# Patient Record
Sex: FEMALE | Race: WHITE | NOT HISPANIC OR LATINO | Employment: OTHER | URBAN - METROPOLITAN AREA
[De-identification: names, ages, dates, MRNs, and addresses within clinical notes are randomized per-mention and may not be internally consistent; named-entity substitution may affect disease eponyms.]

---

## 2017-02-22 ENCOUNTER — CONVERSION ENCOUNTER (OUTPATIENT)
Dept: SURGERY | Facility: CLINIC | Age: 67
End: 2017-02-22

## 2017-03-08 ENCOUNTER — CONVERSION ENCOUNTER (OUTPATIENT)
Dept: SURGERY | Facility: CLINIC | Age: 67
End: 2017-03-08

## 2017-03-29 ENCOUNTER — CONVERSION ENCOUNTER (OUTPATIENT)
Dept: SURGERY | Facility: CLINIC | Age: 67
End: 2017-03-29

## 2017-04-12 ENCOUNTER — CONVERSION ENCOUNTER (OUTPATIENT)
Dept: SURGERY | Facility: CLINIC | Age: 67
End: 2017-04-12

## 2017-04-14 ENCOUNTER — CONVERSION ENCOUNTER (OUTPATIENT)
Dept: SURGERY | Facility: CLINIC | Age: 67
End: 2017-04-14

## 2017-05-17 ENCOUNTER — CONVERSION ENCOUNTER (OUTPATIENT)
Dept: SURGERY | Facility: CLINIC | Age: 67
End: 2017-05-17

## 2018-01-04 ENCOUNTER — HOSPITAL ENCOUNTER (OUTPATIENT)
Dept: CARDIOLOGY | Facility: HOSPITAL | Age: 68
Discharge: HOME | End: 2018-01-04
Attending: INTERNAL MEDICINE | Admitting: INTERNAL MEDICINE

## 2019-06-03 VITALS
DIASTOLIC BLOOD PRESSURE: 87 MMHG | WEIGHT: 146 LBS | OXYGEN SATURATION: 100 % | WEIGHT: 146 LBS | HEART RATE: 97 BPM | WEIGHT: 146 LBS | WEIGHT: 146 LBS | DIASTOLIC BLOOD PRESSURE: 87 MMHG | SYSTOLIC BLOOD PRESSURE: 158 MMHG | OXYGEN SATURATION: 100 % | WEIGHT: 146 LBS | HEART RATE: 97 BPM | WEIGHT: 146 LBS | SYSTOLIC BLOOD PRESSURE: 158 MMHG

## 2020-12-16 ENCOUNTER — TRANSCRIBE ORDERS (OUTPATIENT)
Dept: ADMINISTRATIVE | Facility: HOSPITAL | Age: 70
End: 2020-12-16

## 2020-12-16 DIAGNOSIS — I42.9 CARDIOMYOPATHY, UNSPECIFIED TYPE (HCC): Primary | ICD-10-CM

## 2020-12-30 ENCOUNTER — APPOINTMENT (OUTPATIENT)
Dept: NUCLEAR MEDICINE | Facility: HOSPITAL | Age: 70
End: 2020-12-30

## 2021-01-07 ENCOUNTER — HOSPITAL ENCOUNTER (OUTPATIENT)
Dept: NUCLEAR MEDICINE | Facility: HOSPITAL | Age: 71
Discharge: HOME | End: 2021-01-07

## 2021-01-07 DIAGNOSIS — I42.9 CARDIOMYOPATHY, UNSPECIFIED TYPE (HCC): ICD-10-CM

## 2021-01-07 LAB
BH CV STRESS BP STAGE 1: NORMAL
BH CV STRESS BP STAGE 2: NORMAL
BH CV STRESS BP STAGE 3: NORMAL
BH CV STRESS DURATION MIN STAGE 1: 3
BH CV STRESS DURATION MIN STAGE 2: 3
BH CV STRESS DURATION MIN STAGE 3: 0
BH CV STRESS DURATION SEC STAGE 1: 0
BH CV STRESS DURATION SEC STAGE 2: 0
BH CV STRESS DURATION SEC STAGE 3: 9
BH CV STRESS GRADE STAGE 1: 10
BH CV STRESS GRADE STAGE 2: 12
BH CV STRESS GRADE STAGE 3: 14
BH CV STRESS HR STAGE 1: 115
BH CV STRESS HR STAGE 2: 149
BH CV STRESS HR STAGE 3: 160
BH CV STRESS METS STAGE 1: 5
BH CV STRESS METS STAGE 2: 7.5
BH CV STRESS METS STAGE 3: 10
BH CV STRESS PROTOCOL 1: NORMAL
BH CV STRESS RECOVERY BP: NORMAL MMHG
BH CV STRESS RECOVERY HR: 87 BPM
BH CV STRESS SPEED STAGE 1: 1.7
BH CV STRESS SPEED STAGE 2: 2.5
BH CV STRESS SPEED STAGE 3: 3.4
BH CV STRESS STAGE 1: 1
BH CV STRESS STAGE 2: 2
BH CV STRESS STAGE 3: 3
LV EF NUC BP: 66 %
MAXIMAL PREDICTED HEART RATE: 152 BPM
PERCENT MAX PREDICTED HR: 106.58 %
STRESS BASELINE BP: NORMAL MMHG
STRESS BASELINE HR: 78 BPM
STRESS PERCENT HR: 125 %
STRESS POST PEAK BP: NORMAL MMHG
STRESS POST PEAK HR: 162 BPM
STRESS TARGET HR: 129 BPM

## 2021-01-07 PROCEDURE — A9500 TC99M SESTAMIBI: HCPCS | Performed by: NURSE PRACTITIONER

## 2021-01-07 PROCEDURE — 93017 CV STRESS TEST TRACING ONLY: CPT

## 2021-01-07 PROCEDURE — 78452 HT MUSCLE IMAGE SPECT MULT: CPT

## 2021-01-07 PROCEDURE — 93018 CV STRESS TEST I&R ONLY: CPT | Performed by: INTERNAL MEDICINE

## 2021-01-07 PROCEDURE — 78452 HT MUSCLE IMAGE SPECT MULT: CPT | Performed by: INTERNAL MEDICINE

## 2021-01-07 PROCEDURE — 0 TECHNETIUM SESTAMIBI: Performed by: NURSE PRACTITIONER

## 2021-01-07 PROCEDURE — 93016 CV STRESS TEST SUPVJ ONLY: CPT | Performed by: INTERNAL MEDICINE

## 2021-01-07 RX ADMIN — TECHNETIUM TC 99M SESTAMIBI 1 DOSE: 1 INJECTION INTRAVENOUS at 11:20

## 2021-01-07 RX ADMIN — TECHNETIUM TC 99M SESTAMIBI 1 DOSE: 1 INJECTION INTRAVENOUS at 09:20

## 2021-01-14 ENCOUNTER — OFFICE VISIT (OUTPATIENT)
Dept: CARDIOLOGY | Facility: CLINIC | Age: 71
End: 2021-01-14

## 2021-01-14 VITALS
WEIGHT: 152 LBS | SYSTOLIC BLOOD PRESSURE: 138 MMHG | HEIGHT: 66 IN | TEMPERATURE: 97.8 F | DIASTOLIC BLOOD PRESSURE: 90 MMHG | BODY MASS INDEX: 24.43 KG/M2

## 2021-01-14 DIAGNOSIS — R06.02 SHORTNESS OF BREATH: Primary | ICD-10-CM

## 2021-01-14 DIAGNOSIS — I50.22 CHRONIC SYSTOLIC CONGESTIVE HEART FAILURE (HCC): ICD-10-CM

## 2021-01-14 DIAGNOSIS — E78.00 PURE HYPERCHOLESTEROLEMIA: ICD-10-CM

## 2021-01-14 DIAGNOSIS — I10 ESSENTIAL HYPERTENSION: ICD-10-CM

## 2021-01-14 DIAGNOSIS — R94.39 ABNORMAL NUCLEAR STRESS TEST: ICD-10-CM

## 2021-01-14 PROCEDURE — 99204 OFFICE O/P NEW MOD 45 MIN: CPT | Performed by: INTERNAL MEDICINE

## 2021-01-14 RX ORDER — UBIDECARENONE 50 MG
100 CAPSULE ORAL DAILY
COMMUNITY

## 2021-01-14 RX ORDER — ASCORBIC ACID 500 MG
500 TABLET ORAL DAILY
COMMUNITY

## 2021-01-14 RX ORDER — MULTIPLE VITAMINS W/ MINERALS TAB 9MG-400MCG
1 TAB ORAL DAILY
COMMUNITY

## 2021-01-14 NOTE — PROGRESS NOTES
"    Subjective:     Encounter Date:01/14/2021      Patient ID: Teri Chi is a 70 y.o. female.    Chief Complaint:  History of Present Illness 70-year-old white female with history of hypertension hyperlipidemia and recently diagnosed LV dysfunction presents to my office for a new consultation.  Patient was having shortness of breath and had an echocardiogram which showed a EF of about 45%.  She did have underwent a stress partially which was abnormal with anterior wall ischemia.  She does not have any symptoms of chest pain.  No complains of any palpitation dizziness syncope or swelling of the feet.  She is taking her medicines regularly.  She does not smoke.    The following portions of the patient's history were reviewed and updated as appropriate: allergies, current medications, past family history, past medical history, past social history, past surgical history and problem list.  Past Medical History:   Diagnosis Date   • Cancer (CMS/HCC)     BREAST - RIGHT   • Depression    • Hypertension      Past Surgical History:   Procedure Laterality Date   • BREAST LUMPECTOMY Right    • HYSTERECTOMY     • MASTECTOMY Bilateral    • TONSILLECTOMY       /90   Temp 97.8 °F (36.6 °C)   Ht 167.6 cm (66\")   Wt 68.9 kg (152 lb)   BMI 24.53 kg/m²   Family History   Problem Relation Age of Onset   • Hypertension Brother    • Heart attack Paternal Aunt    • Heart attack Paternal Grandfather        Current Outpatient Medications:   •  ascorbic acid (VITAMIN C) 500 MG tablet, Take 500 mg by mouth Daily., Disp: , Rfl:   •  Cholecalciferol (Vitamin D3) 125 MCG (5000 UT) tablet dispersible, Place  on the tongue., Disp: , Rfl:   •  coenzyme Q10 50 MG capsule capsule, Take  by mouth Daily., Disp: , Rfl:   •  escitalopram (LEXAPRO) 5 MG tablet, Take 5 mg by mouth Daily., Disp: , Rfl:   •  letrozole (FEMARA) 2.5 MG tablet, Take  by mouth Daily., Disp: , Rfl:   •  multivitamin with minerals (MULTIVITAMIN ADULT PO), Take " 1 tablet by mouth Daily., Disp: , Rfl:   •  nebivolol (BYSTOLIC) 2.5 MG tablet, Take 2.5 mg by mouth Daily., Disp: , Rfl:   •  Zinc 50 MG capsule, Take  by mouth., Disp: , Rfl:   Allergies   Allergen Reactions   • Metronidazole Unknown - High Severity   • Penicillins Hives   • Sulfa Antibiotics Hives   • Tetracycline Nausea Only   • Adhesive Tape Rash     NO STERI STRIPS      Social History     Socioeconomic History   • Marital status: Single     Spouse name: Not on file   • Number of children: Not on file   • Years of education: Not on file   • Highest education level: Not on file   Tobacco Use   • Smoking status: Never Smoker   • Smokeless tobacco: Never Used   Substance and Sexual Activity   • Alcohol use: Not Currently   • Drug use: Defer   • Sexual activity: Defer     Review of Systems   Constitution: Positive for malaise/fatigue. Negative for fever.   Cardiovascular: Negative for chest pain, dyspnea on exertion, leg swelling and palpitations.   Respiratory: Positive for shortness of breath. Negative for cough.    Skin: Negative for rash.   Gastrointestinal: Negative for abdominal pain, nausea and vomiting.   Neurological: Positive for numbness (L hand). Negative for focal weakness, headaches and light-headedness.   All other systems reviewed and are negative.             Objective:     Constitutional:       Appearance: Well-developed.   Eyes:      General: No scleral icterus.     Conjunctiva/sclera: Conjunctivae normal.   HENT:      Head: Normocephalic and atraumatic.   Neck:      Musculoskeletal: Normal range of motion and neck supple.      Vascular: No carotid bruit or JVD.   Pulmonary:      Effort: Pulmonary effort is normal.      Breath sounds: Normal breath sounds. No wheezing. No rales.   Cardiovascular:      Normal rate. Regular rhythm.   Pulses:     Intact distal pulses.   Abdominal:      General: Bowel sounds are normal.      Palpations: Abdomen is soft.   Skin:     General: Skin is warm and dry.       Findings: No rash.   Neurological:      Mental Status: Alert.       Procedures    Lab Review:         MDM  1.  Shortness of breath with abnormal nuclear study and abnormal echocardiogram  Patient had shortness of breath and had a echocardiogram which showed EF of 45%  Patient also had a stress Myoview which is abnormal with anterior wall ischemia  Patient will need a cardiac catheterization performed.  Discussed with her about procedure risks and benefits.    #2 hypertension  Patient blood pressure is currently stable on medical therapy  3.  Hyperlipidemia.  Patient's lipid levels are followed by the primary care doctor.

## 2021-01-22 ENCOUNTER — LAB (OUTPATIENT)
Dept: LAB | Facility: HOSPITAL | Age: 71
End: 2021-01-22

## 2021-01-22 DIAGNOSIS — R94.39 ABNORMAL NUCLEAR STRESS TEST: ICD-10-CM

## 2021-01-22 DIAGNOSIS — I10 ESSENTIAL HYPERTENSION: ICD-10-CM

## 2021-01-22 DIAGNOSIS — E78.00 PURE HYPERCHOLESTEROLEMIA: ICD-10-CM

## 2021-01-22 DIAGNOSIS — R06.02 SHORTNESS OF BREATH: ICD-10-CM

## 2021-01-22 DIAGNOSIS — I50.22 CHRONIC SYSTOLIC CONGESTIVE HEART FAILURE (HCC): ICD-10-CM

## 2021-01-22 LAB
ANION GAP SERPL CALCULATED.3IONS-SCNC: 11.2 MMOL/L (ref 5–15)
BASOPHILS # BLD AUTO: 0.07 10*3/MM3 (ref 0–0.2)
BASOPHILS NFR BLD AUTO: 1.2 % (ref 0–1.5)
BUN SERPL-MCNC: 17 MG/DL (ref 8–23)
BUN/CREAT SERPL: 23.6 (ref 7–25)
CALCIUM SPEC-SCNC: 9.4 MG/DL (ref 8.6–10.5)
CHLORIDE SERPL-SCNC: 101 MMOL/L (ref 98–107)
CHOLEST SERPL-MCNC: 193 MG/DL (ref 0–200)
CO2 SERPL-SCNC: 24.8 MMOL/L (ref 22–29)
CREAT SERPL-MCNC: 0.72 MG/DL (ref 0.57–1)
DEPRECATED RDW RBC AUTO: 38.6 FL (ref 37–54)
EOSINOPHIL # BLD AUTO: 0.13 10*3/MM3 (ref 0–0.4)
EOSINOPHIL NFR BLD AUTO: 2.2 % (ref 0.3–6.2)
ERYTHROCYTE [DISTWIDTH] IN BLOOD BY AUTOMATED COUNT: 13.2 % (ref 12.3–15.4)
GFR SERPL CREATININE-BSD FRML MDRD: 81 ML/MIN/1.73
GLUCOSE SERPL-MCNC: 75 MG/DL (ref 65–99)
HCT VFR BLD AUTO: 39.5 % (ref 34–46.6)
HDLC SERPL-MCNC: 61 MG/DL (ref 40–60)
HGB BLD-MCNC: 13.2 G/DL (ref 12–15.9)
IMM GRANULOCYTES # BLD AUTO: 0.01 10*3/MM3 (ref 0–0.05)
IMM GRANULOCYTES NFR BLD AUTO: 0.2 % (ref 0–0.5)
LDLC SERPL CALC-MCNC: 120 MG/DL (ref 0–100)
LDLC/HDLC SERPL: 1.95 {RATIO}
LYMPHOCYTES # BLD AUTO: 2.09 10*3/MM3 (ref 0.7–3.1)
LYMPHOCYTES NFR BLD AUTO: 35.8 % (ref 19.6–45.3)
MCH RBC QN AUTO: 27.1 PG (ref 26.6–33)
MCHC RBC AUTO-ENTMCNC: 33.4 G/DL (ref 31.5–35.7)
MCV RBC AUTO: 81.1 FL (ref 79–97)
MONOCYTES # BLD AUTO: 0.58 10*3/MM3 (ref 0.1–0.9)
MONOCYTES NFR BLD AUTO: 9.9 % (ref 5–12)
NEUTROPHILS NFR BLD AUTO: 2.96 10*3/MM3 (ref 1.7–7)
NEUTROPHILS NFR BLD AUTO: 50.7 % (ref 42.7–76)
NRBC BLD AUTO-RTO: 0 /100 WBC (ref 0–0.2)
PLATELET # BLD AUTO: 214 10*3/MM3 (ref 140–450)
PMV BLD AUTO: 11 FL (ref 6–12)
POTASSIUM SERPL-SCNC: 4.3 MMOL/L (ref 3.5–5.2)
RBC # BLD AUTO: 4.87 10*6/MM3 (ref 3.77–5.28)
SARS-COV-2 ORF1AB RESP QL NAA+PROBE: NOT DETECTED
SODIUM SERPL-SCNC: 137 MMOL/L (ref 136–145)
TRIGL SERPL-MCNC: 66 MG/DL (ref 0–150)
VLDLC SERPL-MCNC: 12 MG/DL (ref 5–40)
WBC # BLD AUTO: 5.84 10*3/MM3 (ref 3.4–10.8)

## 2021-01-22 PROCEDURE — U0004 COV-19 TEST NON-CDC HGH THRU: HCPCS

## 2021-01-22 PROCEDURE — C9803 HOPD COVID-19 SPEC COLLECT: HCPCS

## 2021-01-22 PROCEDURE — 80061 LIPID PANEL: CPT

## 2021-01-22 PROCEDURE — 36415 COLL VENOUS BLD VENIPUNCTURE: CPT

## 2021-01-22 PROCEDURE — 85025 COMPLETE CBC W/AUTO DIFF WBC: CPT

## 2021-01-22 PROCEDURE — 80048 BASIC METABOLIC PNL TOTAL CA: CPT

## 2021-01-25 ENCOUNTER — HOSPITAL ENCOUNTER (OUTPATIENT)
Facility: HOSPITAL | Age: 71
Setting detail: HOSPITAL OUTPATIENT SURGERY
Discharge: HOME | End: 2021-01-25
Attending: INTERNAL MEDICINE | Admitting: INTERNAL MEDICINE

## 2021-01-25 VITALS
HEIGHT: 66 IN | WEIGHT: 151.46 LBS | RESPIRATION RATE: 16 BRPM | BODY MASS INDEX: 24.34 KG/M2 | SYSTOLIC BLOOD PRESSURE: 110 MMHG | TEMPERATURE: 97.5 F | HEART RATE: 65 BPM | OXYGEN SATURATION: 100 % | DIASTOLIC BLOOD PRESSURE: 65 MMHG

## 2021-01-25 DIAGNOSIS — E78.00 PURE HYPERCHOLESTEROLEMIA: ICD-10-CM

## 2021-01-25 DIAGNOSIS — I10 ESSENTIAL HYPERTENSION: ICD-10-CM

## 2021-01-25 DIAGNOSIS — R06.02 SHORTNESS OF BREATH: ICD-10-CM

## 2021-01-25 DIAGNOSIS — I50.22 CHRONIC SYSTOLIC CONGESTIVE HEART FAILURE (HCC): ICD-10-CM

## 2021-01-25 DIAGNOSIS — R94.39 ABNORMAL NUCLEAR STRESS TEST: ICD-10-CM

## 2021-01-25 PROCEDURE — 93458 L HRT ARTERY/VENTRICLE ANGIO: CPT | Performed by: INTERNAL MEDICINE

## 2021-01-25 PROCEDURE — C1894 INTRO/SHEATH, NON-LASER: HCPCS | Performed by: INTERNAL MEDICINE

## 2021-01-25 PROCEDURE — 99153 MOD SED SAME PHYS/QHP EA: CPT | Performed by: INTERNAL MEDICINE

## 2021-01-25 PROCEDURE — 0 IOPAMIDOL PER 1 ML: Performed by: INTERNAL MEDICINE

## 2021-01-25 PROCEDURE — C1769 GUIDE WIRE: HCPCS | Performed by: INTERNAL MEDICINE

## 2021-01-25 PROCEDURE — 25010000002 FENTANYL CITRATE (PF) 100 MCG/2ML SOLUTION: Performed by: INTERNAL MEDICINE

## 2021-01-25 PROCEDURE — 99152 MOD SED SAME PHYS/QHP 5/>YRS: CPT | Performed by: INTERNAL MEDICINE

## 2021-01-25 PROCEDURE — 25010000002 MIDAZOLAM PER 1 MG: Performed by: INTERNAL MEDICINE

## 2021-01-25 RX ORDER — FENTANYL CITRATE 50 UG/ML
INJECTION, SOLUTION INTRAMUSCULAR; INTRAVENOUS AS NEEDED
Status: DISCONTINUED | OUTPATIENT
Start: 2021-01-25 | End: 2021-01-25 | Stop reason: HOSPADM

## 2021-01-25 RX ORDER — LIDOCAINE HYDROCHLORIDE 20 MG/ML
INJECTION, SOLUTION INFILTRATION; PERINEURAL AS NEEDED
Status: DISCONTINUED | OUTPATIENT
Start: 2021-01-25 | End: 2021-01-25 | Stop reason: HOSPADM

## 2021-01-25 RX ORDER — ACETAMINOPHEN 325 MG/1
650 TABLET ORAL EVERY 4 HOURS PRN
Status: DISCONTINUED | OUTPATIENT
Start: 2021-01-25 | End: 2021-01-25 | Stop reason: HOSPADM

## 2021-01-25 RX ORDER — SODIUM CHLORIDE 9 MG/ML
75 INJECTION, SOLUTION INTRAVENOUS CONTINUOUS
Status: DISCONTINUED | OUTPATIENT
Start: 2021-01-25 | End: 2021-01-25 | Stop reason: HOSPADM

## 2021-01-25 RX ORDER — BIOTIN 10000 MCG
1 CAPSULE ORAL DAILY
COMMUNITY
End: 2021-02-09

## 2021-01-25 RX ORDER — MIDAZOLAM HYDROCHLORIDE 1 MG/ML
INJECTION INTRAMUSCULAR; INTRAVENOUS AS NEEDED
Status: DISCONTINUED | OUTPATIENT
Start: 2021-01-25 | End: 2021-01-25 | Stop reason: HOSPADM

## 2021-01-25 RX ORDER — SODIUM CHLORIDE 9 MG/ML
250 INJECTION, SOLUTION INTRAVENOUS ONCE AS NEEDED
Status: DISCONTINUED | OUTPATIENT
Start: 2021-01-25 | End: 2021-01-25 | Stop reason: HOSPADM

## 2021-01-25 RX ORDER — SODIUM CHLORIDE 9 MG/ML
30 INJECTION, SOLUTION INTRAVENOUS CONTINUOUS
Status: DISCONTINUED | OUTPATIENT
Start: 2021-01-25 | End: 2021-01-25 | Stop reason: HOSPADM

## 2021-01-25 RX ADMIN — SODIUM CHLORIDE 30 ML/HR: 9 INJECTION, SOLUTION INTRAVENOUS at 07:42

## 2021-01-25 NOTE — DISCHARGE INSTRUCTIONS
Post Cath Instructions    Call Dr. Dominguez’s office to schedule a follow up appointment in 4 weeks at [phone].    1) Drink plenty of fluids for the next 24 hours.  This helps to eliminate the dye used in your procedure through urination.  You may resume a normal diet; however, try to avoid foods that would cause gas or constipation.    2) Sedative medication given to you during your catheterization may decrease your judgement and reaction time for up to 24-48 hours.  Therefore:  a. DO NOT drive or operate hazardous machinery (48 hours)  b. DO NOT consume alcoholic beverages  c. DO NOT make any important/legal decisions  d. Have someone stay with you for at least 24 hours    3) To allow proper healing and prevent bleeding, the following activities are to be strictly avoided for the next 24-48 hours:  a. Excessive bending at wound site  b. Straining (anything that would tense up muscles around the affected puncture site)  c. Lifting objects greater than 5 pounds, pushing, or pulling for 5 days  i. For Groin Cases:  1. Refrain from sexual activity  2. Refrain from running or vigorous walking  3. No prolonged sitting or standing  4. Limit stair climbing as much as possible    4) Keep the puncture site clean and dry.  You may remove the dressing tomorrow and replace it with a band-aid for at least one additional day.  Gently clean the site with mild soap and water.  No scrubbing/rubbing and lightly pat the area dry.  Showers are acceptable; however, avoid submerging in water (tub baths, hot tubs, swimming pools, dishwater, etc…) for at least one week.  The site should be completely healed before resuming these activities to reduce the risk of infection.  Check the site often.  Watch for signs and symptoms of infection and notify your physician if any of the following occur:  a. Bleeding or an increase in swelling at the puncture site  b. Fever  c. Increased soreness around puncture site  d. Foul odor or significant  drainage from the puncture site  e. Swelling, redness, or warmth at the puncture site    **A bruise or small “pea sized” lump under the skin at the puncture site is not unusual.  This should disappear within 3-4 weeks.**  5) CONTACT YOUR PHYSICIAN OR CALL 911 IF YOU EXPERIENCE ANY OF THE FOLLOWING:  a. Increased angina (chest pain) or frequent sensations of pressure, burning, pain, or other discomfort in the chest, arm, jaws, or stomach  b. Lightheadedness, dizziness, faint feeling, sweating, or difficulty breathing  c. Odd sensation changes like numbness, tingling, coldness, or pain in the arm or leg in which the catheter was inserted  d. Limb in which the catheter was inserted becomes pale/bluish in color    IMPORTANT:  Although this occurs very rarely, if you should develop bright red or excessive bleeding, feel a “pop” inside at the insertion site, or notice a sudden increase in swelling larger than a walnut, you should call 911.  Hold continuous firm pressure to the access site until emergency personnel arrive.  It is best if someone else can do this for you.

## 2021-02-03 ENCOUNTER — TRANSCRIBE ORDERS (OUTPATIENT)
Dept: PREADMISSION TESTING | Facility: HOSPITAL | Age: 71
End: 2021-02-03

## 2021-02-03 DIAGNOSIS — Z01.818 OTHER SPECIFIED PRE-OPERATIVE EXAMINATION: Primary | ICD-10-CM

## 2021-02-09 ENCOUNTER — APPOINTMENT (OUTPATIENT)
Dept: PREADMISSION TESTING | Facility: HOSPITAL | Age: 71
End: 2021-02-09

## 2021-02-09 ENCOUNTER — TRANSCRIBE ORDERS (OUTPATIENT)
Dept: SLEEP MEDICINE | Facility: HOSPITAL | Age: 71
End: 2021-02-09

## 2021-02-09 VITALS
RESPIRATION RATE: 20 BRPM | SYSTOLIC BLOOD PRESSURE: 146 MMHG | HEART RATE: 72 BPM | WEIGHT: 156.5 LBS | BODY MASS INDEX: 25.15 KG/M2 | HEIGHT: 66 IN | TEMPERATURE: 97.6 F | OXYGEN SATURATION: 99 % | DIASTOLIC BLOOD PRESSURE: 85 MMHG

## 2021-02-09 DIAGNOSIS — Z01.818 OTHER SPECIFIED PRE-OPERATIVE EXAMINATION: Primary | ICD-10-CM

## 2021-02-09 RX ORDER — BIOTIN 10 MG
10 TABLET ORAL DAILY
COMMUNITY

## 2021-02-13 ENCOUNTER — APPOINTMENT (OUTPATIENT)
Dept: LAB | Facility: HOSPITAL | Age: 71
End: 2021-02-13

## 2021-02-13 ENCOUNTER — LAB (OUTPATIENT)
Dept: LAB | Facility: HOSPITAL | Age: 71
End: 2021-02-13

## 2021-02-13 DIAGNOSIS — Z01.818 OTHER SPECIFIED PRE-OPERATIVE EXAMINATION: ICD-10-CM

## 2021-02-13 LAB — SARS-COV-2 ORF1AB RESP QL NAA+PROBE: NOT DETECTED

## 2021-02-13 PROCEDURE — C9803 HOPD COVID-19 SPEC COLLECT: HCPCS

## 2021-02-13 PROCEDURE — U0004 COV-19 TEST NON-CDC HGH THRU: HCPCS

## 2021-02-17 ENCOUNTER — TRANSCRIBE ORDERS (OUTPATIENT)
Dept: LAB | Facility: HOSPITAL | Age: 71
End: 2021-02-17

## 2021-02-17 DIAGNOSIS — Z01.818 OTHER SPECIFIED PRE-OPERATIVE EXAMINATION: Primary | ICD-10-CM

## 2021-02-23 ENCOUNTER — LAB (OUTPATIENT)
Dept: LAB | Facility: HOSPITAL | Age: 71
End: 2021-02-23

## 2021-02-23 DIAGNOSIS — Z01.818 OTHER SPECIFIED PRE-OPERATIVE EXAMINATION: ICD-10-CM

## 2021-02-23 LAB — SARS-COV-2 ORF1AB RESP QL NAA+PROBE: NOT DETECTED

## 2021-02-23 PROCEDURE — C9803 HOPD COVID-19 SPEC COLLECT: HCPCS

## 2021-02-23 PROCEDURE — U0004 COV-19 TEST NON-CDC HGH THRU: HCPCS

## 2021-02-24 ENCOUNTER — OFFICE VISIT (OUTPATIENT)
Dept: CARDIOLOGY | Facility: CLINIC | Age: 71
End: 2021-02-24

## 2021-02-24 VITALS
HEART RATE: 76 BPM | DIASTOLIC BLOOD PRESSURE: 84 MMHG | TEMPERATURE: 97.3 F | HEIGHT: 66 IN | SYSTOLIC BLOOD PRESSURE: 129 MMHG | WEIGHT: 158 LBS | BODY MASS INDEX: 25.39 KG/M2 | OXYGEN SATURATION: 100 %

## 2021-02-24 DIAGNOSIS — I10 ESSENTIAL HYPERTENSION: Primary | ICD-10-CM

## 2021-02-24 DIAGNOSIS — E78.00 PURE HYPERCHOLESTEROLEMIA: ICD-10-CM

## 2021-02-24 PROCEDURE — 99213 OFFICE O/P EST LOW 20 MIN: CPT | Performed by: INTERNAL MEDICINE

## 2021-02-24 NOTE — PROGRESS NOTES
"    Subjective:     Encounter Date:02/24/2021      Patient ID: Teri Chi is a 70 y.o. female.    Chief Complaint:  History of Present Illness 70-year-old white female with history of hypertension hyperlipidemia presents to my office for follow-up.  Patient was having symptoms of chest pain shortness of breath and had a stress Myoview which was abnormal hence she underwent a cardiac iteration which showed normal coronaries with normal LV function.  Patient is currently stable without any symptom    The following portions of the patient's history were reviewed and updated as appropriate: allergies, current medications, past family history, past medical history, past social history, past surgical history and problem list.  Past Medical History:   Diagnosis Date   • BRCA positive    • Depression    • History of breast cancer     RIGHT BREAST 2001 - CHEMO, RADIATION, LUMPECTOMY   2016  - RIGHT BREAST BILAT MASTECTOMY    • Hypertension    • Left wrist sprain    • Ptosis of both eyebrows    • SOB (shortness of breath)      - WORKED UP FROM ECHO AND ABNORMAL STRESS TEST - CARDIAC CATH 1/25/21 - OK  - ALL IN EPIC      Past Surgical History:   Procedure Laterality Date   • BREAST LUMPECTOMY Right 2001   • CARDIAC CATHETERIZATION N/A 1/25/2021    Procedure: Left Heart Cath with angiogram;  Surgeon: Werner Dominguez MD;  Location: [facility] CATH INVASIVE LOCATION;  Service: Cardiovascular;  Laterality: N/A;   • CARDIAC CATHETERIZATION N/A 1/25/2021    Procedure: Left ventriculography;  Surgeon: Werner Dominguez MD;  Location: [facility] CATH INVASIVE LOCATION;  Service: Cardiovascular;  Laterality: N/A;   • HYSTERECTOMY     • MASTECTOMY Bilateral 2016   • MOHS SURGERY      [facility] - NECK    • TONSILLECTOMY       /84   Pulse 76   Temp 97.3 °F (36.3 °C)   Ht 167.6 cm (66\")   Wt 71.7 kg (158 lb)   SpO2 100%   BMI 25.50 kg/m²   Family History   Problem Relation Age of Onset   • Hypertension Brother    • Heart attack " Paternal Aunt    • Heart attack Paternal Grandfather    • Malig Hyperthermia Neg Hx        Current Outpatient Medications:   •  ascorbic acid (VITAMIN C) 500 MG tablet, Take 500 mg by mouth Daily., Disp: , Rfl:   •  Biotin 10 MG tablet, Take 10 mg by mouth Daily., Disp: , Rfl:   •  coenzyme Q10 50 MG capsule capsule, Take 100 mg by mouth Daily. HELD FOR OR, Disp: , Rfl:   •  Cyanocobalamin (VITAMIN B-12 SL), Place 1 tablet under the tongue Daily. HELD FOR OR, Disp: , Rfl:   •  escitalopram (LEXAPRO) 5 MG tablet, Take 5 mg by mouth Every Night., Disp: , Rfl:   •  letrozole (FEMARA) 2.5 MG tablet, Take 2.5 mg by mouth Every Night., Disp: , Rfl:   •  multivitamin with minerals (MULTIVITAMIN ADULT PO), Take 1 tablet by mouth Daily. HELD FOR OR, Disp: , Rfl:   •  nebivolol (BYSTOLIC) 2.5 MG tablet, Take 2.5 mg by mouth Every Night., Disp: , Rfl:   •  vitamin D3 125 MCG (5000 UT) capsule capsule, Take 5,000 Units by mouth Daily., Disp: , Rfl:   •  Zinc 50 MG capsule, Take 50 mg by mouth Daily. HELD FOR OR, Disp: , Rfl:   Allergies   Allergen Reactions   • Metronidazole Unknown - High Severity   • Penicillins Hives   • Sulfa Antibiotics Hives   • Tetracycline Nausea Only   • Adhesive Tape Rash     NO STERI STRIPS      Social History     Socioeconomic History   • Marital status: Single     Spouse name: Not on file   • Number of children: Not on file   • Years of education: Not on file   • Highest education level: Not on file   Tobacco Use   • Smoking status: Never Smoker   • Smokeless tobacco: Never Used   Substance and Sexual Activity   • Alcohol use: Yes     Comment: OCCASIONAL    • Drug use: Not Currently   • Sexual activity: Defer     Review of Systems   Constitution: Negative for fever and malaise/fatigue.   Cardiovascular: Negative for chest pain, dyspnea on exertion, leg swelling and palpitations.   Respiratory: Negative for cough and shortness of breath.    Skin: Negative for rash.   Gastrointestinal: Negative for  abdominal pain, nausea and vomiting.   Neurological: Positive for numbness (left hand). Negative for focal weakness, headaches and light-headedness.   All other systems reviewed and are negative.             Objective:     Constitutional:       Appearance: Well-developed.   Eyes:      General: No scleral icterus.     Conjunctiva/sclera: Conjunctivae normal.   HENT:      Head: Normocephalic and atraumatic.   Neck:      Musculoskeletal: Normal range of motion and neck supple.      Vascular: No carotid bruit or JVD.   Pulmonary:      Effort: Pulmonary effort is normal.      Breath sounds: Normal breath sounds. No wheezing. No rales.   Cardiovascular:      Normal rate. Regular rhythm.   Pulses:     Intact distal pulses.   Abdominal:      General: Bowel sounds are normal.      Palpations: Abdomen is soft.   Skin:     General: Skin is warm and dry.      Findings: No rash.   Neurological:      Mental Status: Alert.       Procedures    Lab Review:         MDM  1.  Chest pain or shortness of breath  Patient had an abnormal Myoview study and has normal coronaries with normal function  Patient will have work-up for other causes of chest pain  2.  Hypertension  Patient blood pressure is currently stable on medical therapy  3.  Hyperlipidemia  Patient lipid levels are followed by the primary care doctor.

## 2021-02-25 ENCOUNTER — ANESTHESIA EVENT (OUTPATIENT)
Dept: PERIOP | Facility: HOSPITAL | Age: 71
End: 2021-02-25

## 2021-02-25 ENCOUNTER — HOSPITAL ENCOUNTER (OUTPATIENT)
Facility: HOSPITAL | Age: 71
Setting detail: HOSPITAL OUTPATIENT SURGERY
Discharge: HOME | End: 2021-02-25
Attending: OPHTHALMOLOGY | Admitting: OPHTHALMOLOGY

## 2021-02-25 ENCOUNTER — ANESTHESIA (OUTPATIENT)
Dept: PERIOP | Facility: HOSPITAL | Age: 71
End: 2021-02-25

## 2021-02-25 VITALS
TEMPERATURE: 97.8 F | DIASTOLIC BLOOD PRESSURE: 83 MMHG | RESPIRATION RATE: 16 BRPM | HEART RATE: 55 BPM | SYSTOLIC BLOOD PRESSURE: 142 MMHG | OXYGEN SATURATION: 99 %

## 2021-02-25 DIAGNOSIS — H02.831 DERMATOCHALASIS OF BOTH UPPER EYELIDS: Primary | ICD-10-CM

## 2021-02-25 DIAGNOSIS — H02.834 DERMATOCHALASIS OF BOTH UPPER EYELIDS: Primary | ICD-10-CM

## 2021-02-25 PROCEDURE — 25010000002 DEXAMETHASONE PER 1 MG: Performed by: NURSE ANESTHETIST, CERTIFIED REGISTERED

## 2021-02-25 PROCEDURE — 25010000002 FENTANYL CITRATE (PF) 100 MCG/2ML SOLUTION: Performed by: NURSE ANESTHETIST, CERTIFIED REGISTERED

## 2021-02-25 PROCEDURE — 25010000002 ONDANSETRON PER 1 MG: Performed by: NURSE ANESTHETIST, CERTIFIED REGISTERED

## 2021-02-25 PROCEDURE — 25010000002 PROPOFOL 10 MG/ML EMULSION: Performed by: NURSE ANESTHETIST, CERTIFIED REGISTERED

## 2021-02-25 RX ORDER — SODIUM CHLORIDE 0.9 % (FLUSH) 0.9 %
3-10 SYRINGE (ML) INJECTION AS NEEDED
Status: DISCONTINUED | OUTPATIENT
Start: 2021-02-25 | End: 2021-02-25 | Stop reason: HOSPADM

## 2021-02-25 RX ORDER — ERYTHROMYCIN 5 MG/G
OINTMENT OPHTHALMIC 2 TIMES DAILY
Qty: 3.5 G | Refills: 0 | Status: SHIPPED | OUTPATIENT
Start: 2021-02-25

## 2021-02-25 RX ORDER — HYDROMORPHONE HYDROCHLORIDE 1 MG/ML
0.5 INJECTION, SOLUTION INTRAMUSCULAR; INTRAVENOUS; SUBCUTANEOUS
Status: DISCONTINUED | OUTPATIENT
Start: 2021-02-25 | End: 2021-02-25 | Stop reason: HOSPADM

## 2021-02-25 RX ORDER — MAGNESIUM HYDROXIDE 1200 MG/15ML
LIQUID ORAL AS NEEDED
Status: DISCONTINUED | OUTPATIENT
Start: 2021-02-25 | End: 2021-02-25 | Stop reason: HOSPADM

## 2021-02-25 RX ORDER — SODIUM CHLORIDE, SODIUM LACTATE, POTASSIUM CHLORIDE, CALCIUM CHLORIDE 600; 310; 30; 20 MG/100ML; MG/100ML; MG/100ML; MG/100ML
9 INJECTION, SOLUTION INTRAVENOUS CONTINUOUS
Status: DISCONTINUED | OUTPATIENT
Start: 2021-02-25 | End: 2021-02-25 | Stop reason: HOSPADM

## 2021-02-25 RX ORDER — LIDOCAINE HYDROCHLORIDE 20 MG/ML
INJECTION, SOLUTION INFILTRATION; PERINEURAL AS NEEDED
Status: DISCONTINUED | OUTPATIENT
Start: 2021-02-25 | End: 2021-02-25 | Stop reason: SURG

## 2021-02-25 RX ORDER — SODIUM CHLORIDE 0.9 % (FLUSH) 0.9 %
3 SYRINGE (ML) INJECTION EVERY 12 HOURS SCHEDULED
Status: DISCONTINUED | OUTPATIENT
Start: 2021-02-25 | End: 2021-02-25 | Stop reason: HOSPADM

## 2021-02-25 RX ORDER — DEXAMETHASONE SODIUM PHOSPHATE 10 MG/ML
INJECTION INTRAMUSCULAR; INTRAVENOUS AS NEEDED
Status: DISCONTINUED | OUTPATIENT
Start: 2021-02-25 | End: 2021-02-25 | Stop reason: SURG

## 2021-02-25 RX ORDER — FENTANYL CITRATE 50 UG/ML
100 INJECTION, SOLUTION INTRAMUSCULAR; INTRAVENOUS
Status: DISCONTINUED | OUTPATIENT
Start: 2021-02-25 | End: 2021-02-25 | Stop reason: HOSPADM

## 2021-02-25 RX ORDER — SCOLOPAMINE TRANSDERMAL SYSTEM 1 MG/1
1 PATCH, EXTENDED RELEASE TRANSDERMAL ONCE
Status: DISCONTINUED | OUTPATIENT
Start: 2021-02-25 | End: 2021-02-25 | Stop reason: HOSPADM

## 2021-02-25 RX ORDER — ERYTHROMYCIN 5 MG/G
OINTMENT OPHTHALMIC AS NEEDED
Status: DISCONTINUED | OUTPATIENT
Start: 2021-02-25 | End: 2021-02-25 | Stop reason: HOSPADM

## 2021-02-25 RX ORDER — PROPOFOL 10 MG/ML
VIAL (ML) INTRAVENOUS AS NEEDED
Status: DISCONTINUED | OUTPATIENT
Start: 2021-02-25 | End: 2021-02-25 | Stop reason: SURG

## 2021-02-25 RX ORDER — FENTANYL CITRATE 50 UG/ML
INJECTION, SOLUTION INTRAMUSCULAR; INTRAVENOUS AS NEEDED
Status: DISCONTINUED | OUTPATIENT
Start: 2021-02-25 | End: 2021-02-25 | Stop reason: SURG

## 2021-02-25 RX ORDER — EPHEDRINE SULFATE 50 MG/ML
5 INJECTION, SOLUTION INTRAVENOUS ONCE AS NEEDED
Status: DISCONTINUED | OUTPATIENT
Start: 2021-02-25 | End: 2021-02-25 | Stop reason: HOSPADM

## 2021-02-25 RX ORDER — ONDANSETRON 2 MG/ML
4 INJECTION INTRAMUSCULAR; INTRAVENOUS ONCE AS NEEDED
Status: DISCONTINUED | OUTPATIENT
Start: 2021-02-25 | End: 2021-02-25 | Stop reason: HOSPADM

## 2021-02-25 RX ORDER — HYDROCODONE BITARTRATE AND ACETAMINOPHEN 5; 325 MG/1; MG/1
1 TABLET ORAL EVERY 4 HOURS PRN
Qty: 15 TABLET | Refills: 0 | Status: SHIPPED | OUTPATIENT
Start: 2021-02-25

## 2021-02-25 RX ORDER — ONDANSETRON 2 MG/ML
INJECTION INTRAMUSCULAR; INTRAVENOUS AS NEEDED
Status: DISCONTINUED | OUTPATIENT
Start: 2021-02-25 | End: 2021-02-25 | Stop reason: SURG

## 2021-02-25 RX ORDER — FLUMAZENIL 0.1 MG/ML
0.2 INJECTION INTRAVENOUS AS NEEDED
Status: DISCONTINUED | OUTPATIENT
Start: 2021-02-25 | End: 2021-02-25 | Stop reason: HOSPADM

## 2021-02-25 RX ORDER — MIDAZOLAM HYDROCHLORIDE 1 MG/ML
0.5 INJECTION INTRAMUSCULAR; INTRAVENOUS
Status: DISCONTINUED | OUTPATIENT
Start: 2021-02-25 | End: 2021-02-25 | Stop reason: HOSPADM

## 2021-02-25 RX ORDER — MIDAZOLAM HYDROCHLORIDE 1 MG/ML
1 INJECTION INTRAMUSCULAR; INTRAVENOUS
Status: DISCONTINUED | OUTPATIENT
Start: 2021-02-25 | End: 2021-02-25 | Stop reason: HOSPADM

## 2021-02-25 RX ORDER — FENTANYL CITRATE 50 UG/ML
50 INJECTION, SOLUTION INTRAMUSCULAR; INTRAVENOUS
Status: DISCONTINUED | OUTPATIENT
Start: 2021-02-25 | End: 2021-02-25 | Stop reason: HOSPADM

## 2021-02-25 RX ORDER — LIDOCAINE HYDROCHLORIDE 10 MG/ML
0.5 INJECTION, SOLUTION EPIDURAL; INFILTRATION; INTRACAUDAL; PERINEURAL ONCE AS NEEDED
Status: DISCONTINUED | OUTPATIENT
Start: 2021-02-25 | End: 2021-02-25 | Stop reason: HOSPADM

## 2021-02-25 RX ORDER — HYDROCODONE BITARTRATE AND ACETAMINOPHEN 7.5; 325 MG/1; MG/1
1 TABLET ORAL ONCE AS NEEDED
Status: DISCONTINUED | OUTPATIENT
Start: 2021-02-25 | End: 2021-02-25 | Stop reason: HOSPADM

## 2021-02-25 RX ORDER — OXYCODONE AND ACETAMINOPHEN 7.5; 325 MG/1; MG/1
1 TABLET ORAL ONCE AS NEEDED
Status: DISCONTINUED | OUTPATIENT
Start: 2021-02-25 | End: 2021-02-25 | Stop reason: HOSPADM

## 2021-02-25 RX ADMIN — ONDANSETRON 4 MG: 2 INJECTION INTRAMUSCULAR; INTRAVENOUS at 11:18

## 2021-02-25 RX ADMIN — SODIUM CHLORIDE, POTASSIUM CHLORIDE, SODIUM LACTATE AND CALCIUM CHLORIDE 9 ML/HR: 600; 310; 30; 20 INJECTION, SOLUTION INTRAVENOUS at 09:03

## 2021-02-25 RX ADMIN — SCOPALAMINE 1 PATCH: 1 PATCH, EXTENDED RELEASE TRANSDERMAL at 09:54

## 2021-02-25 RX ADMIN — LIDOCAINE HYDROCHLORIDE 100 MG: 20 INJECTION, SOLUTION INFILTRATION; PERINEURAL at 10:47

## 2021-02-25 RX ADMIN — DEXAMETHASONE SODIUM PHOSPHATE 8 MG: 10 INJECTION INTRAMUSCULAR; INTRAVENOUS at 11:00

## 2021-02-25 RX ADMIN — FENTANYL CITRATE 100 MCG: 50 INJECTION INTRAMUSCULAR; INTRAVENOUS at 10:47

## 2021-02-25 RX ADMIN — SODIUM CHLORIDE, POTASSIUM CHLORIDE, SODIUM LACTATE AND CALCIUM CHLORIDE: 600; 310; 30; 20 INJECTION, SOLUTION INTRAVENOUS at 11:34

## 2021-02-25 RX ADMIN — PROPOFOL 200 MG: 10 INJECTION, EMULSION INTRAVENOUS at 10:47

## 2021-02-25 NOTE — ANESTHESIA POSTPROCEDURE EVALUATION
Patient: Teri Chi    Procedure Summary     Date: 02/25/21 Room / Location:  [facility]  /  [facility]    Anesthesia Start: 1043 Anesthesia Stop: 1230    Procedure: BILATERAL UPPER LID BLEPHAROPLASTY BILATERAL INTERNAL BROWPEXY (Bilateral Eye) Diagnosis:     Surgeon: Myles Gunderson MD Provider: Brii Banda MD    Anesthesia Type: general ASA Status: 2          Anesthesia Type: general    Vitals  Vitals Value Taken Time   /78 02/25/21 1300   Temp 36.6 °C (97.8 °F) 02/25/21 1300   Pulse 57 02/25/21 1303   Resp 14 02/25/21 1300   SpO2 97 % 02/25/21 1303   Vitals shown include unvalidated device data.        Post Anesthesia Care and Evaluation    Patient location during evaluation: bedside  Patient participation: complete - patient participated  Level of consciousness: awake  Pain management: adequate  Airway patency: patent  Anesthetic complications: No anesthetic complications  PONV Status: controlled  Cardiovascular status: acceptable  Respiratory status: acceptable  Hydration status: acceptable    Comments: --------------------            02/25/21               1307     --------------------   BP:       142/83     Pulse:      55       Resp:       16       Temp:                SpO2:      99%      --------------------

## 2021-02-25 NOTE — ANESTHESIA PREPROCEDURE EVALUATION
Anesthesia Evaluation     Patient summary reviewed and Nursing notes reviewed   NPO Solid Status: > 8 hours             Airway   Mallampati: II  TM distance: >3 FB  Neck ROM: full  no difficulty expected  Dental - normal exam     Comment: Temporary crowns x 2       Pulmonary - negative pulmonary ROS and normal exam   Cardiovascular - normal exam    (+) hypertension,       Neuro/Psych- negative ROS  GI/Hepatic/Renal/Endo - negative ROS     Musculoskeletal (-) negative ROS    Abdominal  - normal exam   Substance History - negative use     OB/GYN negative ob/gyn ROS         Other        ROS/Med Hx Other: Hx breast ca                Anesthesia Plan    ASA 2     general     intravenous induction     Anesthetic plan, all risks, benefits, and alternatives have been provided, discussed and informed consent has been obtained with: patient.    Plan discussed with CRNA.

## 2021-02-25 NOTE — ANESTHESIA PROCEDURE NOTES
Airway  Urgency: elective    Date/Time: 2/25/2021 10:49 AM    General Information and Staff    Patient location during procedure: OR  Anesthesiologist: Brii Banda MD  CRNA: Kemi Walter CRNA    Indications and Patient Condition    Preoxygenated: yes  Mask difficulty assessment: 0 - not attempted    Final Airway Details  Final airway type: supraglottic airway      Successful airway: unique  Size 4    Number of attempts at approach: 1  Assessment: lips, teeth, and gum same as pre-op and atraumatic intubation    Additional Comments  Atraumatic, adeq seal, secured, MV/AV until SV

## 2025-02-11 ENCOUNTER — OFFICE VISIT (OUTPATIENT)
Dept: ORTHOPEDIC SURGERY | Facility: CLINIC | Age: 75
End: 2025-02-11
Payer: MEDICARE

## 2025-02-11 VITALS — OXYGEN SATURATION: 99 % | HEIGHT: 66 IN | WEIGHT: 158 LBS | BODY MASS INDEX: 25.39 KG/M2

## 2025-02-11 DIAGNOSIS — M89.9 SCAPULAR DYSFUNCTION: ICD-10-CM

## 2025-02-11 DIAGNOSIS — M25.512 ACUTE PAIN OF LEFT SHOULDER: Primary | ICD-10-CM

## 2025-02-11 RX ORDER — ATORVASTATIN CALCIUM 10 MG/1
TABLET, FILM COATED ORAL
COMMUNITY
Start: 2025-01-29

## 2025-02-11 NOTE — PROGRESS NOTES
"Teri is a 74 y.o. year old female presents to [facility]    Chief Complaint   Patient presents with    Left Shoulder - Initial Evaluation     Pain for 4 weeks, no injury       History of Present Illness  Teri Chi is a pleasant right handed 74 y.o. female presents to clinic for evaluation of left shoulder pain for the past 3-4 weeks without any known injury. Pain wakes her up at night. Qualifies: sharp, denies pain at rest. Provocative: cross body adduction, reaching for  seatbelt, undoing bra, abduction + external rotation. Treatment: ibuprofen 3-4 every 6 hours, icy/hot, rest, ice packs, massage therapist, heating pad, Voltaren gel, activity modifications, lacrosse ball massages against wall.     I have reviewed the patient's medical, family, and social history in detail and updated the computerized patient record.    Objective:  Ht 167.6 cm (66\")   Wt 71.7 kg (158 lb)   SpO2 99%   BMI 25.50 kg/m²      Physical Exam    Vital signs reviewed.   General: No acute distress.  Eyes: conjunctiva clear  ENT: external ears atraumatic  CV: no peripheral edema  Resp: normal respiratory effort  Skin: no rashes or wounds; normal turgor  Psych: mood and affect appropriate; recent and remote memory intact  Neuro: sensation to light touch intact    MSK Exam    Left shoulder:  Intact skin. No effusion. No signs of atrophy  No deformity or wounds, no redness or swelling appreciated.  Patient has full, AROM with forward flexion, external rotation and internal rotation.  No significant pain reported with ROM.  No pain nor weakness with [facility] and supraspinatus testing  Negative Neer  Negative Jeffries; negative scarf  Negative Speed  Belly press 5/5; lift off 5/5  Range of motion at the elbow, wrist and digits grossly intact  Radial pulse palpable capillary refill less than 1 second all digits   strength 5/5 and equal bilaterally  Sensory neural intact in all " distributions    Imaging:  XR Shoulder 2+ View Left (02/11/2025 13:52)       Assessment:  Diagnoses and all orders for this visit:    Acute pain of left shoulder  -     XR Shoulder 2+ View Left    Scapular dysfunction    Other orders  -     atorvastatin (LIPITOR) 10 MG tablet    Plan: Recommend [facility] at [facility] to work on scapular dysfunction and associated HEP.  If no resolution come back for further evaluation and possible advanced imaging.  All questions answered follow-up as needed.      BMI is >= 25 and <30. (Overweight) The following options were offered after discussion;: weight loss educational material (shared in after visit summary)       Follow Up   No follow-ups on file.  Patient was given instructions and counseling regarding her condition or for health maintenance advice. Please see specific information pulled into the AVS if appropriate.     EMR Dragon/Transcription disclaimer:    Much of this encounter note is an electronic transcription/translation of spoken language to printed text.  The electronic translation of spoken language may permit erroneous, or at times, nonsensical words or phrases to be inadvertently transcribed.  Although I have reviewed the note for such errors some may still exist.

## 2025-03-10 ENCOUNTER — TELEPHONE (OUTPATIENT)
Dept: ORTHOPEDIC SURGERY | Facility: CLINIC | Age: 75
End: 2025-03-10
Payer: MEDICARE

## 2025-03-10 DIAGNOSIS — M25.512 ACUTE PAIN OF LEFT SHOULDER: Primary | ICD-10-CM

## 2025-03-10 NOTE — TELEPHONE ENCOUNTER
"Caller: Teri Chi \"Teri Chi\"    Relationship to patient: Self    Best call back number: [phone]*    Chief complaint: LEFT SHOULDER     Type of visit: FOLLOW UP    Requested date: ASAP     Additional notes:PT IS WANTING TO SEE DR CHAPIN IF POSSIBLE.. HER SHOULDER IS IN A TON OF PAIN.. SHE STATES IT IS NOT ALLOWING HER TO SLEEP.. PLEASE ADVISE..           "

## 2025-03-12 ENCOUNTER — TELEPHONE (OUTPATIENT)
Dept: ORTHOPEDIC SURGERY | Facility: CLINIC | Age: 75
End: 2025-03-12
Payer: MEDICARE

## 2025-03-12 NOTE — TELEPHONE ENCOUNTER
Approved and valid 03/12/2025-05/11/2025  Auth# [id number]     Called and spoke to patient. Let them know that MRI is approved and the order was faxed to [facility]. Once scheduled/completed, patient is to contact the office and make a follow up appointment to go over results.

## 2025-03-12 NOTE — TELEPHONE ENCOUNTER
"Provider: MORRISON    Caller: Teri Chi \"Teri Chi\"    Relationship to Patient: Self    Phone Number: [phone]    Reason for Call: PATIENT STATES SHE CALLED [facility], AND THEY TOLD HER THEY DIDN'T RECEIVE HER REFERRAL. SHE IS ASKING IF THIS CAN BE SENT AGAIN PLEASE.  "

## 2025-03-25 ENCOUNTER — TELEPHONE (OUTPATIENT)
Dept: ORTHOPEDIC SURGERY | Facility: CLINIC | Age: 75
End: 2025-03-25

## 2025-03-25 NOTE — TELEPHONE ENCOUNTER
"Caller: Teri Chi \"Teri Chi\"    Relationship to patient: Self    Best call back number: [phone]*    Patient is needing: PT IS CALLING TO SEE IF [facility] HAS RECEIVED THE MRI RESULTS AND IF SO TO GO OVER THEM WITH HER..  PLEASE ADVISE..          "

## 2025-04-14 ENCOUNTER — OFFICE VISIT (OUTPATIENT)
Dept: ORTHOPEDIC SURGERY | Facility: CLINIC | Age: 75
End: 2025-04-14
Payer: MEDICARE

## 2025-04-14 VITALS — HEIGHT: 66 IN | RESPIRATION RATE: 20 BRPM | WEIGHT: 158 LBS | BODY MASS INDEX: 25.39 KG/M2

## 2025-04-14 DIAGNOSIS — M25.512 ACUTE PAIN OF LEFT SHOULDER: Primary | ICD-10-CM

## 2025-04-14 PROCEDURE — 99213 OFFICE O/P EST LOW 20 MIN: CPT | Performed by: ORTHOPAEDIC SURGERY

## 2025-04-14 PROCEDURE — 20610 DRAIN/INJ JOINT/BURSA W/O US: CPT | Performed by: ORTHOPAEDIC SURGERY

## 2025-04-14 RX ORDER — TRIAMCINOLONE ACETONIDE 40 MG/ML
80 INJECTION, SUSPENSION INTRA-ARTICULAR; INTRAMUSCULAR ONCE
Status: COMPLETED | OUTPATIENT
Start: 2025-04-14 | End: 2025-04-14

## 2025-04-14 RX ADMIN — TRIAMCINOLONE ACETONIDE 80 MG: 40 INJECTION, SUSPENSION INTRA-ARTICULAR; INTRAMUSCULAR at 10:22

## 2025-04-14 NOTE — PROGRESS NOTES
Patient ID: Teri Chi is a 74 y.o. female.    Chief Complaint:    Chief Complaint   Patient presents with    Left Shoulder - Follow-up             HPI:  This is a 74-year-old female here with atraumatic left shoulder pain.  She has pain deep in the shoulder has difficulty moving and lifting her arm.  She has not had prior shoulder surgery.  She has tried to stretch and use some over-the-counter medication  Past Medical History:   Diagnosis Date    BRCA positive     Depression     History of breast cancer     RIGHT BREAST 2001 - CHEMO, RADIATION, LUMPECTOMY   2016  - RIGHT BREAST BILAT MASTECTOMY     Hypertension     Left wrist sprain     Ptosis of both eyebrows     Rotator cuff syndrome 01/15    Cant sleep on it hurts    SOB (shortness of breath)      - WORKED UP FROM ECHO AND ABNORMAL STRESS TEST - CARDIAC CATH 1/25/21 - OK  - ALL IN EPIC        Past Surgical History:   Procedure Laterality Date    BLEPHAROPLASTY Bilateral 2/25/2021    Procedure: BILATERAL UPPER LID BLEPHAROPLASTY BILATERAL INTERNAL BROWPEXY;  Surgeon: Myles Gunderson MD;  Location: [facility] [place];  Service: Ophthalmology;  Laterality: Bilateral;    BREAST LUMPECTOMY Right 2001    CARDIAC CATHETERIZATION N/A 1/25/2021    Procedure: Left Heart Cath with angiogram;  Surgeon: Werner Dominguez MD;  Location:  [facility] CATH INVASIVE LOCATION;  Service: Cardiovascular;  Laterality: N/A;    CARDIAC CATHETERIZATION N/A 1/25/2021    Procedure: Left ventriculography;  Surgeon: Werner Dominguez MD;  Location: [facility] CATH INVASIVE LOCATION;  Service: Cardiovascular;  Laterality: N/A;    HYSTERECTOMY      MASTECTOMY Bilateral 2016    MOHS SURGERY      SQCC - NECK     TONSILLECTOMY         Family History   Problem Relation Age of Onset    Hypertension Brother     Heart attack Paternal Aunt     Heart attack Paternal Grandfather     Cancer Mother         Breast    Malig Hyperthermia Neg Hx           Social History     Occupational History    Not  "on file   Tobacco Use    Smoking status: Never    Smokeless tobacco: Never   Vaping Use    Vaping status: Never Used   Substance and Sexual Activity    Alcohol use: Yes     Comment: OCCASIONAL     Drug use: Never    Sexual activity: Not Currently     Partners: Male     Birth control/protection: Hysterectomy      Review of Systems   Cardiovascular:  Negative for chest pain.   Musculoskeletal:  Positive for arthralgias.       Objective:    Resp 20   Ht 167.6 cm (66\")   Wt 71.7 kg (158 lb)   BMI 25.50 kg/m²     Physical Examination:  Left shoulder demonstrates intact skin mild pain over the impingement area bicep groove passive shoulder elevation 170 abduction 140 external rotation 50 internal rotation L4 with mild pain but minimal weakness on Speed Morton supraspinatus testing.  Belly press and liftoff are 5/5.  Sensory and motor exam are intact all distributions. Radial pulse is palpable and capillary refill is less than two seconds to all digits.    Imaging:  Prior x-ray and MRI reviewed x-rays demonstrate well-maintained joint space without impingement MRI demonstrates mild to moderate arthritis partial supraspinatus tear and fluid in the biceps sheath    Assessment:  Left shoulder arthritis with partial cuff tear    Plan:  Options discussed continue over-the-counter medication ice heat and stretching  I recommend injection after today's evaluation. Risks and benefits of the injection were discussed. Under sterile technique and after timeout and verbal consent I injected 80 mg of Kenalog and 2 mL of 1% Lidocaine in the shoulder and subacromial space. It was well tolerated.  See me in 6 weeks as needed      Procedures         Disclaimer: Part of this note may be an electronic transcription/translation of spoken language to printed text using the Dragon Dictation System  "

## 2025-04-16 ENCOUNTER — PATIENT ROUNDING (BHMG ONLY) (OUTPATIENT)
Dept: ORTHOPEDIC SURGERY | Facility: CLINIC | Age: 75
End: 2025-04-16
Payer: MEDICARE

## (undated) DEVICE — GLV SURG BIOGEL SENSR LTX PF SZ7.5

## (undated) DEVICE — CATH DIAG IMPULSE FL4 6F 100CM

## (undated) DEVICE — SWABSTK SKINPREP PVPI LF PK/3

## (undated) DEVICE — CATH DIAG IMPULSE PIG .056 6F 110CM

## (undated) DEVICE — ELECTRD NDL EZ CLN MOD 2.75IN

## (undated) DEVICE — CROUCH CORNEAL PROTECTOR: Brand: BAUSCH + LOMB

## (undated) DEVICE — PK ENT 40

## (undated) DEVICE — SKIN PREP TRAY W/CHG: Brand: MEDLINE INDUSTRIES, INC.

## (undated) DEVICE — GOWN,NON-REINFORCED,SIRUS,SET IN SLV,XL: Brand: MEDLINE

## (undated) DEVICE — WIPE INST MEROCEL

## (undated) DEVICE — GW DIAG EMERALD HEPCOAT MOVE JTIP STD .035 3MM 150CM

## (undated) DEVICE — GLV SURG BIOGEL LTX PF 6 1/2

## (undated) DEVICE — PENCL E/S ULTRAVAC TELESCP NOSE HOLSTR 10FT

## (undated) DEVICE — IMMOB HD UNIV CLR DISP

## (undated) DEVICE — STERILE COTTON TIP 6IN 10PK: Brand: MEDLINE

## (undated) DEVICE — SUT GUT PLN FAST ABS 5/0 PC1 18IN 1915G

## (undated) DEVICE — NDL HYPO PRECISIONGLIDE REG 25G 1 1/2

## (undated) DEVICE — PK TRY HEART CATH 50

## (undated) DEVICE — CATH DIAG IMPULSE FR4 6F 100CM

## (undated) DEVICE — PINNACLE INTRODUCER SHEATH: Brand: PINNACLE

## (undated) DEVICE — STERILE TOOTHPICK SET: Brand: CENTURION